# Patient Record
Sex: FEMALE | Race: WHITE | NOT HISPANIC OR LATINO | Employment: OTHER | URBAN - METROPOLITAN AREA
[De-identification: names, ages, dates, MRNs, and addresses within clinical notes are randomized per-mention and may not be internally consistent; named-entity substitution may affect disease eponyms.]

---

## 2024-04-26 ENCOUNTER — APPOINTMENT (OUTPATIENT)
Dept: RADIOLOGY | Facility: CLINIC | Age: 79
End: 2024-04-26
Payer: MEDICARE

## 2024-04-29 ENCOUNTER — HOSPITAL ENCOUNTER (OUTPATIENT)
Dept: RADIOLOGY | Facility: CLINIC | Age: 79
Discharge: HOME | End: 2024-04-29
Payer: MEDICARE

## 2024-04-29 DIAGNOSIS — Z78.0 ASYMPTOMATIC MENOPAUSAL STATE: ICD-10-CM

## 2024-04-29 PROCEDURE — 77080 DXA BONE DENSITY AXIAL: CPT | Performed by: RADIOLOGY

## 2024-04-29 PROCEDURE — 77080 DXA BONE DENSITY AXIAL: CPT

## 2024-05-17 ENCOUNTER — APPOINTMENT (OUTPATIENT)
Dept: CARDIOLOGY | Facility: HOSPITAL | Age: 79
End: 2024-05-17
Payer: MEDICARE

## 2024-05-17 ENCOUNTER — APPOINTMENT (OUTPATIENT)
Dept: RADIOLOGY | Facility: HOSPITAL | Age: 79
End: 2024-05-17
Payer: MEDICARE

## 2024-05-17 ENCOUNTER — HOSPITAL ENCOUNTER (OUTPATIENT)
Dept: RADIOLOGY | Facility: CLINIC | Age: 79
Discharge: HOME | End: 2024-05-17
Payer: MEDICARE

## 2024-05-17 ENCOUNTER — HOSPITAL ENCOUNTER (EMERGENCY)
Facility: HOSPITAL | Age: 79
Discharge: HOME | End: 2024-05-18
Attending: EMERGENCY MEDICINE
Payer: MEDICARE

## 2024-05-17 VITALS — BODY MASS INDEX: 31.65 KG/M2 | WEIGHT: 172 LBS | HEIGHT: 62 IN

## 2024-05-17 DIAGNOSIS — R07.9 CHEST PAIN, UNSPECIFIED TYPE: ICD-10-CM

## 2024-05-17 DIAGNOSIS — S42.201A CLOSED FRACTURE OF PROXIMAL END OF RIGHT HUMERUS, UNSPECIFIED FRACTURE MORPHOLOGY, INITIAL ENCOUNTER: ICD-10-CM
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
## 2024-05-17 DIAGNOSIS — Z12.31 ENCOUNTER FOR SCREENING MAMMOGRAM FOR MALIGNANT NEOPLASM OF BREAST: ICD-10-CM

## 2024-05-17 LAB
ALBUMIN SERPL BCP-MCNC: 4.4 G/DL (ref 3.4–5)
ALP SERPL-CCNC: 103 U/L (ref 33–136)
ALT SERPL W P-5'-P-CCNC: 13 U/L (ref 7–45)
ANION GAP SERPL CALC-SCNC: 17 MMOL/L (ref 10–20)
AST SERPL W P-5'-P-CCNC: 9 U/L (ref 9–39)
BASOPHILS # BLD AUTO: 0.07 X10*3/UL (ref 0–0.1)
BASOPHILS NFR BLD AUTO: 0.8 %
BILIRUB SERPL-MCNC: 0.5 MG/DL (ref 0–1.2)
BNP SERPL-MCNC: 53 PG/ML (ref 0–99)
BUN SERPL-MCNC: 20 MG/DL (ref 6–23)
CALCIUM SERPL-MCNC: 10.7 MG/DL (ref 8.6–10.3)
CARDIAC TROPONIN I PNL SERPL HS: <3 NG/L (ref 0–13)
CHLORIDE SERPL-SCNC: 105 MMOL/L (ref 98–107)
CO2 SERPL-SCNC: 23 MMOL/L (ref 21–32)
CREAT SERPL-MCNC: 0.92 MG/DL (ref 0.5–1.05)
EGFRCR SERPLBLD CKD-EPI 2021: 64 ML/MIN/1.73M*2
EOSINOPHIL # BLD AUTO: 0.07 X10*3/UL (ref 0–0.4)
EOSINOPHIL NFR BLD AUTO: 0.8 %
ERYTHROCYTE [DISTWIDTH] IN BLOOD BY AUTOMATED COUNT: 14.1 % (ref 11.5–14.5)
GLUCOSE SERPL-MCNC: 113 MG/DL (ref 74–99)
HCT VFR BLD AUTO: 39.2 % (ref 36–46)
HGB BLD-MCNC: 12.9 G/DL (ref 12–16)
IMM GRANULOCYTES # BLD AUTO: 0.03 X10*3/UL (ref 0–0.5)
IMM GRANULOCYTES NFR BLD AUTO: 0.4 % (ref 0–0.9)
LIPASE SERPL-CCNC: 42 U/L (ref 9–82)
LYMPHOCYTES # BLD AUTO: 2.88 X10*3/UL (ref 0.8–3)
LYMPHOCYTES NFR BLD AUTO: 34.7 %
MCH RBC QN AUTO: 28.7 PG (ref 26–34)
MCHC RBC AUTO-ENTMCNC: 32.9 G/DL (ref 32–36)
MCV RBC AUTO: 87 FL (ref 80–100)
MONOCYTES # BLD AUTO: 0.55 X10*3/UL (ref 0.05–0.8)
MONOCYTES NFR BLD AUTO: 6.6 %
NEUTROPHILS # BLD AUTO: 4.69 X10*3/UL (ref 1.6–5.5)
NEUTROPHILS NFR BLD AUTO: 56.7 %
NRBC BLD-RTO: 0 /100 WBCS (ref 0–0)
PLATELET # BLD AUTO: 370 X10*3/UL (ref 150–450)
POTASSIUM SERPL-SCNC: 3.4 MMOL/L (ref 3.5–5.3)
PROT SERPL-MCNC: 7.1 G/DL (ref 6.4–8.2)
RBC # BLD AUTO: 4.5 X10*6/UL (ref 4–5.2)
SODIUM SERPL-SCNC: 142 MMOL/L (ref 136–145)
WBC # BLD AUTO: 8.3 X10*3/UL (ref 4.4–11.3)

## 2024-05-17 PROCEDURE — 84484 ASSAY OF TROPONIN QUANT: CPT | Performed by: EMERGENCY MEDICINE

## 2024-05-17 PROCEDURE — 77063 BREAST TOMOSYNTHESIS BI: CPT | Performed by: RADIOLOGY

## 2024-05-17 PROCEDURE — 2500000004 HC RX 250 GENERAL PHARMACY W/ HCPCS (ALT 636 FOR OP/ED)

## 2024-05-17 PROCEDURE — 85025 COMPLETE CBC W/AUTO DIFF WBC: CPT | Performed by: EMERGENCY MEDICINE

## 2024-05-17 PROCEDURE — 96375 TX/PRO/DX INJ NEW DRUG ADDON: CPT

## 2024-05-17 PROCEDURE — 83880 ASSAY OF NATRIURETIC PEPTIDE: CPT | Performed by: EMERGENCY MEDICINE

## 2024-05-17 PROCEDURE — 93005 ELECTROCARDIOGRAM TRACING: CPT

## 2024-05-17 PROCEDURE — 71045 X-RAY EXAM CHEST 1 VIEW: CPT | Performed by: RADIOLOGY

## 2024-05-17 PROCEDURE — 96376 TX/PRO/DX INJ SAME DRUG ADON: CPT

## 2024-05-17 PROCEDURE — 77067 SCR MAMMO BI INCL CAD: CPT

## 2024-05-17 PROCEDURE — 73060 X-RAY EXAM OF HUMERUS: CPT | Mod: RT

## 2024-05-17 PROCEDURE — 36415 COLL VENOUS BLD VENIPUNCTURE: CPT | Performed by: EMERGENCY MEDICINE

## 2024-05-17 PROCEDURE — 99284 EMERGENCY DEPT VISIT MOD MDM: CPT | Mod: 25

## 2024-05-17 PROCEDURE — 80053 COMPREHEN METABOLIC PANEL: CPT | Performed by: EMERGENCY MEDICINE

## 2024-05-17 PROCEDURE — 77067 SCR MAMMO BI INCL CAD: CPT | Performed by: RADIOLOGY

## 2024-05-17 PROCEDURE — 73060 X-RAY EXAM OF HUMERUS: CPT | Mod: RIGHT SIDE | Performed by: RADIOLOGY

## 2024-05-17 PROCEDURE — 71045 X-RAY EXAM CHEST 1 VIEW: CPT

## 2024-05-17 PROCEDURE — 73030 X-RAY EXAM OF SHOULDER: CPT | Mod: RT

## 2024-05-17 PROCEDURE — 96374 THER/PROPH/DIAG INJ IV PUSH: CPT

## 2024-05-17 PROCEDURE — 2500000004 HC RX 250 GENERAL PHARMACY W/ HCPCS (ALT 636 FOR OP/ED): Performed by: EMERGENCY MEDICINE

## 2024-05-17 PROCEDURE — 83690 ASSAY OF LIPASE: CPT | Performed by: EMERGENCY MEDICINE

## 2024-05-17 PROCEDURE — 73030 X-RAY EXAM OF SHOULDER: CPT | Mod: RIGHT SIDE | Performed by: RADIOLOGY

## 2024-05-17 RX ORDER — KETOROLAC TROMETHAMINE 30 MG/ML
15 INJECTION, SOLUTION INTRAMUSCULAR; INTRAVENOUS ONCE
Status: DISCONTINUED | OUTPATIENT
Start: 2024-05-17 | End: 2024-05-17

## 2024-05-17 RX ORDER — OXYCODONE AND ACETAMINOPHEN 5; 325 MG/1; MG/1
1 TABLET ORAL ONCE
Status: DISCONTINUED | OUTPATIENT
Start: 2024-05-17 | End: 2024-05-17

## 2024-05-17 RX ORDER — SODIUM CHLORIDE 9 MG/ML
20 INJECTION, SOLUTION INTRAVENOUS CONTINUOUS
OUTPATIENT
Start: 2024-05-17

## 2024-05-17 RX ORDER — MORPHINE SULFATE 4 MG/ML
4 INJECTION INTRAVENOUS ONCE
Status: COMPLETED | OUTPATIENT
Start: 2024-05-17 | End: 2024-05-17

## 2024-05-17 RX ORDER — ONDANSETRON HYDROCHLORIDE 2 MG/ML
4 INJECTION, SOLUTION INTRAVENOUS ONCE
Status: COMPLETED | OUTPATIENT
Start: 2024-05-17 | End: 2024-05-17

## 2024-05-17 RX ORDER — MORPHINE SULFATE 4 MG/ML
INJECTION INTRAVENOUS
Status: COMPLETED
Start: 2024-05-17 | End: 2024-05-17

## 2024-05-17 RX ADMIN — MORPHINE SULFATE 4 MG: 4 INJECTION INTRAVENOUS at 21:47

## 2024-05-17 RX ADMIN — MORPHINE SULFATE 4 MG: 4 INJECTION INTRAVENOUS at 23:03

## 2024-05-17 RX ADMIN — ONDANSETRON 4 MG: 2 INJECTION INTRAMUSCULAR; INTRAVENOUS at 22:40

## 2024-05-17 ASSESSMENT — PAIN DESCRIPTION - LOCATION
LOCATION: SHOULDER
LOCATION: SHOULDER

## 2024-05-17 ASSESSMENT — PAIN SCALES - GENERAL
PAINLEVEL_OUTOF10: 10 - WORST POSSIBLE PAIN
PAINLEVEL_OUTOF10: 8
PAINLEVEL_OUTOF10: 10 - WORST POSSIBLE PAIN
PAINLEVEL_OUTOF10: 10 - WORST POSSIBLE PAIN
PAINLEVEL_OUTOF10: 8

## 2024-05-17 ASSESSMENT — LIFESTYLE VARIABLES
EVER FELT BAD OR GUILTY ABOUT YOUR DRINKING: NO
TOTAL SCORE: 0
HAVE PEOPLE ANNOYED YOU BY CRITICIZING YOUR DRINKING: NO
EVER HAD A DRINK FIRST THING IN THE MORNING TO STEADY YOUR NERVES TO GET RID OF A HANGOVER: NO
HAVE YOU EVER FELT YOU SHOULD CUT DOWN ON YOUR DRINKING: NO

## 2024-05-17 ASSESSMENT — COLUMBIA-SUICIDE SEVERITY RATING SCALE - C-SSRS
2. HAVE YOU ACTUALLY HAD ANY THOUGHTS OF KILLING YOURSELF?: NO
6. HAVE YOU EVER DONE ANYTHING, STARTED TO DO ANYTHING, OR PREPARED TO DO ANYTHING TO END YOUR LIFE?: NO
1. IN THE PAST MONTH, HAVE YOU WISHED YOU WERE DEAD OR WISHED YOU COULD GO TO SLEEP AND NOT WAKE UP?: NO

## 2024-05-17 ASSESSMENT — PAIN - FUNCTIONAL ASSESSMENT
PAIN_FUNCTIONAL_ASSESSMENT: 0-10

## 2024-05-17 ASSESSMENT — PAIN DESCRIPTION - ORIENTATION
ORIENTATION: RIGHT
ORIENTATION: RIGHT

## 2024-05-17 ASSESSMENT — PAIN DESCRIPTION - PROGRESSION: CLINICAL_PROGRESSION: GRADUALLY WORSENING

## 2024-05-17 ASSESSMENT — PAIN DESCRIPTION - PAIN TYPE: TYPE: ACUTE PAIN

## 2024-05-18 VITALS
WEIGHT: 172 LBS | TEMPERATURE: 97 F | SYSTOLIC BLOOD PRESSURE: 177 MMHG | RESPIRATION RATE: 17 BRPM | HEIGHT: 62 IN | OXYGEN SATURATION: 96 % | BODY MASS INDEX: 31.65 KG/M2 | HEART RATE: 92 BPM | DIASTOLIC BLOOD PRESSURE: 88 MMHG

## 2024-05-18 LAB — CARDIAC TROPONIN I PNL SERPL HS: 4 NG/L (ref 0–13)

## 2024-05-18 RX ORDER — OXYCODONE AND ACETAMINOPHEN 5; 325 MG/1; MG/1
1 TABLET ORAL EVERY 6 HOURS PRN
Qty: 12 TABLET | Refills: 0 | Status: SHIPPED | OUTPATIENT
Start: 2024-05-18 | End: 2024-05-21

## 2024-05-18 ASSESSMENT — HEART SCORE
TROPONIN: LESS THAN OR EQUAL TO NORMAL LIMIT
AGE: 65+
RISK FACTORS: 1-2 RISK FACTORS
HEART SCORE: 3
HISTORY: SLIGHTLY SUSPICIOUS
ECG: NORMAL

## 2024-05-18 ASSESSMENT — PAIN SCALES - GENERAL: PAINLEVEL_OUTOF10: 4

## 2024-05-18 NOTE — ED PROVIDER NOTES
Chief Complaint   Patient presents with    Fall     Pt reportedly fell and tripped over her dog this evening. Pt reports that she hit her right shoulder as she fell. Pt reports no loc and no blood thinners. Pt has swelling to the area on arrival. Pt is A&O x 4 at this time    Shoulder Injury       HPI       79 year old right hand dominant female presents to the Emergency Department today complaining of right shoulder pain status post fall that occurred just prior to arrival. Notes that she tripped over her dog and landed on her right side. Describes the pain as sharp in nature, constant, non-radiating, and varies in intensity. Believes that she might have hit her right forehead. Denies any loss of consciousness or seizure-like activity. Denies any other injuries.       History provided by:  Patient             Patient History   Past Medical History:   Diagnosis Date    Personal history of other endocrine, nutritional and metabolic disease     History of hypercholesterolemia    Traumatic subarachnoid hemorrhage with loss of consciousness status unknown, initial encounter (Multi)     Subarachnoid hemorrhage, traumatic     Past Surgical History:   Procedure Laterality Date    BREAST CYST ASPIRATION Left     OTHER SURGICAL HISTORY  09/12/2022    Cholecystoenterostomy    OTHER SURGICAL HISTORY  09/12/2022    Tubal ligation    OTHER SURGICAL HISTORY  09/12/2022    Hysterectomy    OTHER SURGICAL HISTORY  09/12/2022    Back surgery     Family History   Problem Relation Name Age of Onset    Ovarian cancer Sister       Social History     Tobacco Use    Smoking status: Not on file    Smokeless tobacco: Not on file   Substance Use Topics    Alcohol use: Not on file    Drug use: Not on file           Physical Exam  Constitutional:       Appearance: Normal appearance.   HENT:      Head: Normocephalic.      Right Ear: External ear normal.      Left Ear: External ear normal.      Ears:      Comments: Bilateral auditory canals are  non-inflamed and non-reddened. Bilateral TMs are pearly gray with good light reflex. No hemotympanum or steele signs noted.      Nose: Nose normal.      Comments: Septum midline without deviation. Turbinates are not inflamed and reddened. No septal hematoma noted.      Mouth/Throat:      Comments:  Mucous membranes are moist. All teeth are intact. Uvula midline without deviation rises upon phonation. Tonsils 1+ without exudate.   Eyes:      Comments: Bilateral conjunctiva are without injection, discharge, or drainage. PERRL with consensual pupil response bilaterally. EOM's are intact without any signs of entrapment. No hyphema or raccoon's eyes.   Cardiovascular:      Rate and Rhythm: Normal rate and regular rhythm.      Pulses:           Radial pulses are 3+ on the right side and 3+ on the left side.      Heart sounds: Normal heart sounds. No murmur heard.     No friction rub. No gallop.   Pulmonary:      Effort: Pulmonary effort is normal.      Breath sounds: Normal breath sounds. No wheezing, rhonchi or rales.   Abdominal:      General: Abdomen is flat. Bowel sounds are normal.      Palpations: Abdomen is soft.      Tenderness: There is no right CVA tenderness, left CVA tenderness, guarding or rebound. Negative signs include Arambula's sign and McBurney's sign.   Musculoskeletal:      Cervical back: Full passive range of motion without pain, normal range of motion and neck supple.      Comments: No edema, cyanosis, or clubbing noted. No obvious deformity or ecchymosis to the shoulder, but there is edema and tenderness present over the proximal humerus. Limited ROM. Right radial pulse is strong and regular. Capillary refill was within normal limits. Sensation is intact distally.     Lymphadenopathy:      Cervical: No cervical adenopathy.   Skin:     Comments: No rashes, lesions, petechiae, or purpura. No signs of infection.   Neurological:      Mental Status: She is alert and oriented to person, place, and time.       Comments: Alert and oriented to person, place, or time. Follows commands well. Hand grasps and push/pulls of lower extremities are equally strong bilaterally.    Psychiatric:         Attention and Perception: Attention normal.         Mood and Affect: Mood normal.         Speech: Speech normal.         Labs Reviewed   COMPREHENSIVE METABOLIC PANEL - Abnormal       Result Value    Glucose 113 (*)     Sodium 142      Potassium 3.4 (*)     Chloride 105      Bicarbonate 23      Anion Gap 17      Urea Nitrogen 20      Creatinine 0.92      eGFR 64      Calcium 10.7 (*)     Albumin 4.4      Alkaline Phosphatase 103      Total Protein 7.1      AST 9      Bilirubin, Total 0.5      ALT 13     B-TYPE NATRIURETIC PEPTIDE - Normal    BNP 53      Narrative:        <100 pg/mL - Heart failure unlikely  100-299 pg/mL - Intermediate probability of acute heart                  failure exacerbation. Correlate with clinical                  context and patient history.    >=300 pg/mL - Heart Failure likely. Correlate with clinical                  context and patient history.    BNP testing is performed using different testing methodology at [facility] than at other [facility]. Direct result comparisons should only be made within the same method.      LIPASE - Normal    Lipase 42      Narrative:     Venipuncture immediately after or during the administration of Metamizole may lead to falsely low results. Testing should be performed immediately prior to Metamizole dosing.   SERIAL TROPONIN-INITIAL - Normal    Troponin I, High Sensitivity <3      Narrative:     Less than 99th percentile of normal range cutoff-  Female and children under 18 years old <14 ng/L; Male <21 ng/L: Negative  Repeat testing should be performed if clinically indicated.     Female and children under 18 years old 14-50 ng/L; Male 21-50 ng/L:  Consistent with possible cardiac damage and possible increased clinical   risk. Serial measurements may  help to assess extent of myocardial damage.     >50 ng/L: Consistent with cardiac damage, increased clinical risk and  myocardial infarction. Serial measurements may help assess extent of   myocardial damage.      NOTE: Children less than 1 year old may have higher baseline troponin   levels and results should be interpreted in conjunction with the overall   clinical context.     NOTE: Troponin I testing is performed using a different   testing methodology at [facility] than at other   [facility]. Direct result comparisons should only   be made within the same method.   CBC WITH AUTO DIFFERENTIAL    WBC 8.3      nRBC 0.0      RBC 4.50      Hemoglobin 12.9      Hematocrit 39.2      MCV 87      MCH 28.7      MCHC 32.9      RDW 14.1      Platelets 370      Neutrophils % 56.7      Immature Granulocytes %, Automated 0.4      Lymphocytes % 34.7      Monocytes % 6.6      Eosinophils % 0.8      Basophils % 0.8      Neutrophils Absolute 4.69      Immature Granulocytes Absolute, Automated 0.03      Lymphocytes Absolute 2.88      Monocytes Absolute 0.55      Eosinophils Absolute 0.07      Basophils Absolute 0.07     TROPONIN SERIES- (INITIAL, 1 HR)    Narrative:     The following orders were created for panel order Troponin I Series, High Sensitivity (0, 1 HR).  Procedure                               Abnormality         Status                     ---------                               -----------         ------                     Troponin I, High Sensiti...[[id number]]  Normal              Final result               Troponin, High Sensitivi...[[id number]]                      In process                   Please view results for these tests on the individual orders.   SERIAL TROPONIN, 1 HOUR       XR chest 1 view   Final Result   No acute cardiopulmonary process.        MACRO:   None        Signed by: Clarisse Luevano 5/18/2024 12:08 AM   Dictation workstation:   [id number]      XR shoulder right 2+ views    Final Result   Comminuted impacted displaced fracturing of the proximal right   humerus.        MACRO:   None        Signed by: Enrique Mckenzie 5/17/2024 10:05 PM   Dictation workstation:   [id number]      XR humerus right   Final Result   Comminuted impacted displaced fracturing of the proximal right   humerus.        MACRO:   None        Signed by: Enrique Mckenzie 5/17/2024 10:05 PM   Dictation workstation:   [id number]               ED Course & MDM   ED Course as of 05/18/24 0110   Fri May 17, 2024   2246 EKG performed at 2244 and interpreted by me shows sinus rhythm at a rate of 88.  Intervals are normal.  The axis is normal.  There are no significant ST or T wave changes however there is some mild baseline artifact.  No STEMI. [SP]      ED Course User Index  [SP] Radha Barillas DO         Diagnoses as of 05/18/24 0110   Fall, initial encounter   Closed fracture of proximal end of right humerus, unspecified fracture morphology, initial encounter   Chest pain, unspecified type           Medical Decision Making  Patient was seen and evaluated by Dr. Barillas. Right shoulder and humerus x-rays show a displaced comminuted impacted proximal right humerus fracture. Given Fentanyl in route with slight improvement in her pain. Later, she was given Morphine x 2, Toradol, Percocet, and Zofran with improvement in her pain. Upon reassessment, I was discussing the findings of her x-rays and she began having midsternal chest pressure. At that time, labs were drawn with results as above. Heart Score- 3 with normal EKG and troponin with delta troponin.  At this time, we feel that the chest pain is atypical for acute coronary syndrome. We find no underlying evidence of an acute infectious process or pneumothorax on CXR. Clinically, we do not feel they are exhibiting signs of pulmonary embolism or thoracic aortic dissection (no connective tissue disorder, no tachycardia, tachypnea, hypoxia, and mediastinum normal in size on CXR).  Placed in a sling. Capillary refill was within normal limits and sensation was intact distally post-application. Given sling instructions. Given orthopedics for follow up. Discharged in stable condition with computer instructions.    Diagnostic Impression:    1. Acute closed comminuted impacted displaced proximal humerus fracture    2. Acute atypical chest pain    3. IV meds and fluids in ED     4. Prescription therapy            Your medication list      You have not been prescribed any medications.           Procedure  Procedures     Frank Zuñiga, ANDREEA-CHENCHO  05/18/24 0111       Radha Barillas DO  05/22/24 1318    This patient was seen by the ERICK.  I have personally seen and examined the patient. I made / approved the management plan and take responsibility for patient management. I reviewed and edited the above documentation where necessary.     Patient initially presented for arm pain in the setting of fall.  She was found to have a proximal humerus fracture.  At the time of discharge she was noted to have developed centrally located chest pain.  Because of this additional workup including labs and EKG was performed.  EKG does not show any ischemic changes.  Labs including 2 troponins are reassuring.  The patient was given additional pain medications primarily for her arm however this did resolve her chest pain as well.    At this time I do believe the patient to be stable for outpatient management.  Follow-up and return precautions were given.  Patient is encouraged to return to the emergency department should they develop any new or worsening symptoms.      Radha Barillas DO  Emergency Medicine         Radha Barillas DO  05/22/24 [id number]

## 2024-05-20 ENCOUNTER — APPOINTMENT (OUTPATIENT)
Dept: GASTROENTEROLOGY | Facility: HOSPITAL | Age: 79
End: 2024-05-20
Payer: MEDICARE

## 2024-05-25 LAB
ATRIAL RATE: 88 BPM
P AXIS: 51 DEGREES
PR INTERVAL: 148 MS
Q ONSET: 249 MS
QRS COUNT: 14 BEATS
QRS DURATION: 97 MS
QT INTERVAL: 373 MS
QTC CALCULATION(BAZETT): 452 MS
QTC FREDERICIA: 423 MS
R AXIS: 53 DEGREES
T AXIS: 24 DEGREES
T OFFSET: 436 MS
VENTRICULAR RATE: 88 BPM

## 2024-09-05 ENCOUNTER — ANESTHESIA EVENT (OUTPATIENT)
Dept: GASTROENTEROLOGY | Facility: HOSPITAL | Age: 79
End: 2024-09-05
Payer: MEDICARE

## 2024-09-09 ENCOUNTER — HOSPITAL ENCOUNTER (OUTPATIENT)
Dept: GASTROENTEROLOGY | Facility: HOSPITAL | Age: 79
Discharge: HOME | End: 2024-09-09
Payer: MEDICARE

## 2024-09-09 ENCOUNTER — ANESTHESIA (OUTPATIENT)
Dept: GASTROENTEROLOGY | Facility: HOSPITAL | Age: 79
End: 2024-09-09
Payer: MEDICARE

## 2024-09-09 VITALS
HEIGHT: 62 IN | WEIGHT: 157 LBS | HEART RATE: 94 BPM | TEMPERATURE: 97.5 F | RESPIRATION RATE: 14 BRPM | BODY MASS INDEX: 28.89 KG/M2 | DIASTOLIC BLOOD PRESSURE: 88 MMHG | OXYGEN SATURATION: 98 % | SYSTOLIC BLOOD PRESSURE: 135 MMHG

## 2024-09-09 DIAGNOSIS — R19.7 DIARRHEA, UNSPECIFIED TYPE: ICD-10-CM

## 2024-09-09 DIAGNOSIS — R10.84 GENERALIZED ABDOMINAL PAIN: ICD-10-CM

## 2024-09-09 DIAGNOSIS — R12 HEART BURN: ICD-10-CM

## 2024-09-09 PROBLEM — E78.5 HYPERLIPIDEMIA: Status: ACTIVE | Noted: 2024-09-09

## 2024-09-09 LAB — C DIF TOX TCDA+TCDB STL QL NAA+PROBE: NOT DETECTED

## 2024-09-09 PROCEDURE — 2500000004 HC RX 250 GENERAL PHARMACY W/ HCPCS (ALT 636 FOR OP/ED)

## 2024-09-09 PROCEDURE — 83630 LACTOFERRIN FECAL (QUAL): CPT | Performed by: SURGERY

## 2024-09-09 PROCEDURE — 43239 EGD BIOPSY SINGLE/MULTIPLE: CPT | Performed by: SURGERY

## 2024-09-09 PROCEDURE — 2500000004 HC RX 250 GENERAL PHARMACY W/ HCPCS (ALT 636 FOR OP/ED): Performed by: SURGERY

## 2024-09-09 PROCEDURE — 3700000001 HC GENERAL ANESTHESIA TIME - INITIAL BASE CHARGE

## 2024-09-09 PROCEDURE — 87329 GIARDIA AG IA: CPT | Performed by: SURGERY

## 2024-09-09 PROCEDURE — 87328 CRYPTOSPORIDIUM AG IA: CPT | Performed by: SURGERY

## 2024-09-09 PROCEDURE — 7100000009 HC PHASE TWO TIME - INITIAL BASE CHARGE

## 2024-09-09 PROCEDURE — 7100000010 HC PHASE TWO TIME - EACH INCREMENTAL 1 MINUTE

## 2024-09-09 PROCEDURE — 87493 C DIFF AMPLIFIED PROBE: CPT | Performed by: SURGERY

## 2024-09-09 PROCEDURE — 3700000002 HC GENERAL ANESTHESIA TIME - EACH INCREMENTAL 1 MINUTE

## 2024-09-09 PROCEDURE — 45380 COLONOSCOPY AND BIOPSY: CPT | Performed by: SURGERY

## 2024-09-09 PROCEDURE — 2500000005 HC RX 250 GENERAL PHARMACY W/O HCPCS

## 2024-09-09 RX ORDER — NITROGLYCERIN 0.4 MG/1
0.4 TABLET SUBLINGUAL EVERY 5 MIN PRN
COMMUNITY

## 2024-09-09 RX ORDER — COLESTIPOL HYDROCHLORIDE 5 G/5G
1 GRANULE, FOR SUSPENSION ORAL
COMMUNITY

## 2024-09-09 RX ORDER — LISINOPRIL 10 MG/1
10 TABLET ORAL DAILY
COMMUNITY

## 2024-09-09 RX ORDER — DEXTROMETHORPHAN HYDROBROMIDE, GUAIFENESIN 5; 100 MG/5ML; MG/5ML
650 LIQUID ORAL EVERY 8 HOURS PRN
COMMUNITY

## 2024-09-09 RX ORDER — METOPROLOL TARTRATE 25 MG/1
25 TABLET, FILM COATED ORAL 2 TIMES DAILY
COMMUNITY

## 2024-09-09 RX ORDER — DULOXETIN HYDROCHLORIDE 60 MG/1
60 CAPSULE, DELAYED RELEASE ORAL DAILY
COMMUNITY

## 2024-09-09 RX ORDER — MONTELUKAST SODIUM 10 MG/1
10 TABLET ORAL DAILY
COMMUNITY

## 2024-09-09 RX ORDER — PROPOFOL 10 MG/ML
INJECTION, EMULSION INTRAVENOUS AS NEEDED
Status: DISCONTINUED | OUTPATIENT
Start: 2024-09-09 | End: 2024-09-09

## 2024-09-09 RX ORDER — LIDOCAINE HYDROCHLORIDE 20 MG/ML
INJECTION, SOLUTION EPIDURAL; INFILTRATION; INTRACAUDAL; PERINEURAL AS NEEDED
Status: DISCONTINUED | OUTPATIENT
Start: 2024-09-09 | End: 2024-09-09

## 2024-09-09 RX ORDER — SODIUM CHLORIDE 9 MG/ML
20 INJECTION, SOLUTION INTRAVENOUS CONTINUOUS
Status: DISCONTINUED | OUTPATIENT
Start: 2024-09-09 | End: 2024-09-10 | Stop reason: HOSPADM

## 2024-09-09 RX ORDER — HYDROGEN PEROXIDE 3 %
20 SOLUTION, NON-ORAL MISCELLANEOUS
COMMUNITY

## 2024-09-09 RX ORDER — ASPIRIN 81 MG/1
81 TABLET ORAL DAILY
COMMUNITY

## 2024-09-09 RX ORDER — MECLIZINE HYDROCHLORIDE 25 MG/1
25 TABLET ORAL 3 TIMES DAILY PRN
COMMUNITY

## 2024-09-09 RX ORDER — GABAPENTIN 100 MG/1
100 CAPSULE ORAL NIGHTLY
COMMUNITY

## 2024-09-09 RX ORDER — ATORVASTATIN CALCIUM 80 MG/1
80 TABLET, FILM COATED ORAL DAILY
COMMUNITY

## 2024-09-09 SDOH — HEALTH STABILITY: MENTAL HEALTH: CURRENT SMOKER: 0

## 2024-09-09 ASSESSMENT — PAIN SCALES - GENERAL
PAINLEVEL_OUTOF10: 0 - NO PAIN
PAIN_LEVEL: 0
PAINLEVEL_OUTOF10: 0 - NO PAIN

## 2024-09-09 ASSESSMENT — PAIN - FUNCTIONAL ASSESSMENT
PAIN_FUNCTIONAL_ASSESSMENT: 0-10
PAIN_FUNCTIONAL_ASSESSMENT: 0-10

## 2024-09-09 ASSESSMENT — COLUMBIA-SUICIDE SEVERITY RATING SCALE - C-SSRS
1. IN THE PAST MONTH, HAVE YOU WISHED YOU WERE DEAD OR WISHED YOU COULD GO TO SLEEP AND NOT WAKE UP?: NO
2. HAVE YOU ACTUALLY HAD ANY THOUGHTS OF KILLING YOURSELF?: NO
6. HAVE YOU EVER DONE ANYTHING, STARTED TO DO ANYTHING, OR PREPARED TO DO ANYTHING TO END YOUR LIFE?: NO

## 2024-09-09 NOTE — DISCHARGE INSTRUCTIONS
Patient Instructions after a Colonoscopy      The anesthetics, sedatives or narcotics which were given to you today will be acting in your body for the next 24 hours, so you might feel a little sleepy or groggy.  This feeling should slowly wear off. Carefully read and follow the instructions.     You received sedation today:  - Do not drive or operate any machinery or power tools of any kind.   - No alcoholic beverages today, not even beer or wine.  - Do not make any important decisions or sign any legal documents.  - No over the counter medications that contain alcohol or that may cause drowsiness.  - Do not make any important decisions or sign any legal documents.  - Make sure you have someone with you for first 24 hours.    While it is common to experience mild to moderate abdominal distention, gas, or belching after your procedure, if any of these symptoms occur following discharge from the GI Lab or within one week of having your procedure, call the [facility] to be advised whether a visit to your nearest Urgent Care or Emergency Department is indicated.  Take this paper with you if you go.     - If you develop an allergic reaction to the medications that were given during your procedure such as difficulty breathing, rash, hives, severe nausea, vomiting or lightheadedness.  - If you experience chest pain, shortness of breath, severe abdominal pain, fevers and chills.  -If you develop signs and symptoms of bleeding such as blood in your spit, if your stools turn black, tarry, or bloody  - If you have not urinated within 8 hours following your procedure.  - If your IV site becomes painful, red, inflamed, or looks infected.    If you received a biopsy/polypectomy/sphincterotomy the following instructions apply below:    __ Do not use Aspirin containing products, non-steroidal medications or anti-coagulants for one week following your procedure. (Examples of these types of medications are: Advil,  Arthrotec, Aleve, Coumadin, Ecotrin, Heparin, Ibuprofen, Indocin, Motrin, Naprosyn, Nuprin, Plavix, Vioxx, and Voltarin, or their generic forms.  This list is not all-inclusive.  Check with your physician or pharmacist before resuming medications.)   __ Eat a soft diet today.  Avoid foods that are poorly digested for the next 24 hours.  These foods would include: nuts, beans, lettuce, red meats, and fried foods. Start with liquids and advance your diet as tolerated, gradually work up to eating solids.   __ Do not have a Barium Study or Enema for one week.    Your physician recommends the additional following instructions:    -You have a contact number available for emergencies. The signs and symptoms of potential delayed complications were discussed with you. You may return to normal activities tomorrow.  -Resume your previous diet.  -Continue your present medications.   -We are waiting for your pathology results.  -Your physician has recommended a repeat colonoscopy (date to be determined after pending pathology results are reviewed) for surveillance based on pathology results.  -The findings and recommendations have been discussed with you.  -The findings and recommendations were discussed with your family.  - Please see Medication Reconciliation Form for new medication/medications prescribed.       If you experience any problems or have any questions following discharge from the GI Lab, please call:        Nurse Signature                                                                        Date___________________                                                                            Patient/Responsible Party Signature                                        Date___________________

## 2024-09-09 NOTE — H&P
History Of Present Illness  Naina Jc is a 79 y.o. female presenting for EGD and colonoscopy.     Past Medical History  Past Medical History:   Diagnosis Date    Anxiety     Colon polyp     Depression     GERD (gastroesophageal reflux disease)     Hyperlipidemia     Hypertension     Irritable bowel     Personal history of other endocrine, nutritional and metabolic disease     History of hypercholesterolemia    Traumatic subarachnoid hemorrhage with loss of consciousness status unknown, initial encounter (Multi)     Subarachnoid hemorrhage, traumatic       Surgical History  Past Surgical History:   Procedure Laterality Date    BREAST CYST ASPIRATION Left     OTHER SURGICAL HISTORY  09/12/2022    Cholecystoenterostomy    OTHER SURGICAL HISTORY  09/12/2022    Tubal ligation    OTHER SURGICAL HISTORY  09/12/2022    Hysterectomy    OTHER SURGICAL HISTORY  09/12/2022    Back surgery        Social History  She reports that she has never smoked. She has never used smokeless tobacco. She reports that she does not currently use alcohol. She reports that she does not use drugs.    Family History  Family History   Problem Relation Name Age of Onset    Ovarian cancer Sister      Colon cancer Brother  60 - 69        Allergies  Patient has no known allergies.    Review of Systems     Physical Exam  Eyes:      Extraocular Movements: Extraocular movements intact.      Pupils: Pupils are equal, round, and reactive to light.   Cardiovascular:      Rate and Rhythm: Normal rate and regular rhythm.   Pulmonary:      Effort: Pulmonary effort is normal.   Abdominal:      Palpations: Abdomen is soft.   Skin:     General: Skin is warm and dry.   Neurological:      Mental Status: She is alert.   Psychiatric:         Mood and Affect: Mood normal.         Behavior: Behavior normal.          Last Recorded Vitals  Blood pressure 126/87, pulse (!) 116, temperature 36.3 °C (97.3 °F), temperature source Temporal, resp. rate 18, height 1.575 m  "(5' 2\"), weight 71.2 kg (157 lb), SpO2 98%.    Relevant Results             Assessment/Plan   Assessment & Plan  Generalized abdominal pain    Heart burn    Diarrhea, unspecified type             I spent 15 minutes in the professional and overall care of this patient.      Ana Yu MD    "

## 2024-09-09 NOTE — ANESTHESIA POSTPROCEDURE EVALUATION
Patient: Naina Jc    Procedure Summary       Date: 09/09/24 Room / Location: [facility]    Anesthesia Start: 1122 Anesthesia Stop: 1157    Procedures:       EGD      COLONOSCOPY Diagnosis:       Generalized abdominal pain      Heart burn      Diarrhea, unspecified type    Scheduled Providers: Ana Yu MD Responsible Provider: THERESE Rebolledo    Anesthesia Type: MAC ASA Status: 2            Anesthesia Type: MAC    Vitals Value Taken Time   /88 09/09/24 1218   Temp 36.4 °C (97.5 °F) 09/09/24 1218   Pulse 94 09/09/24 1207   Resp 14 09/09/24 1218   SpO2 98 % 09/09/24 1218       Anesthesia Post Evaluation    Patient location during evaluation: PACU  Patient participation: complete - patient participated  Level of consciousness: awake and alert  Pain score: 0  Pain management: adequate  Airway patency: patent  Cardiovascular status: acceptable  Respiratory status: acceptable  Hydration status: acceptable  Postoperative Nausea and Vomiting: none        No notable events documented.

## 2024-09-09 NOTE — ANESTHESIA PREPROCEDURE EVALUATION
Patient: Naina Jc    Procedure Information       Date/Time: 09/09/24 1115    Scheduled providers: Ana Yu MD    Procedures:       EGD      COLONOSCOPY    Location: [facility]            Relevant Problems   Anesthesia (within normal limits)      Cardiac   (+) Hyperlipidemia   (+) Hypertension      Neuro   (+) Anxiety   (+) Depression   (+) Traumatic subarachnoid hemorrhage with loss of consciousness status unknown, initial encounter (Multi)      GI   (+) GERD (gastroesophageal reflux disease)   (+) Irritable bowel      Endocrine   (+) Personal history of other endocrine, nutritional and metabolic disease      Digestive   (+) Colon polyp       Clinical information reviewed:    Allergies  Meds   Med Hx  Surg Hx   Fam Hx  Soc Hx        NPO Detail:  NPO/Void Status  Date of Last Liquid: 09/08/24  Time of Last Liquid: 2100  Date of Last Solid: 09/07/24  Time of Last Solid: 1400  Last Intake Type: Solid meal; Clear fluids  Time of Last Void: 0930         Physical Exam    Airway  Mallampati: II  TM distance: >3 FB  Neck ROM: full     Cardiovascular - normal exam     Dental - normal exam     Pulmonary - normal exam     Abdominal            Anesthesia Plan    History of general anesthesia?: yes  History of complications of general anesthesia?: no    ASA 2     MAC     The patient is not a current smoker.  Patient did not smoke on day of procedure.    intravenous induction   Anesthetic plan and risks discussed with patient.

## 2024-09-11 LAB — LACTOFERRIN STL QL IA: POSITIVE

## 2024-09-12 LAB
CRYPTOSP AG STL QL IA: NEGATIVE
G LAMBLIA AG STL QL IA: NEGATIVE

## 2024-09-15 LAB — O+P STL MICRO: NEGATIVE

## 2024-09-16 LAB
LABORATORY COMMENT REPORT: NORMAL
PATH REPORT.COMMENTS IMP SPEC: NORMAL
PATH REPORT.FINAL DX SPEC: NORMAL
PATH REPORT.GROSS SPEC: NORMAL
PATH REPORT.TOTAL CANCER: NORMAL

## 2025-01-07 ENCOUNTER — HOSPITAL ENCOUNTER (OUTPATIENT)
Dept: RADIOLOGY | Facility: HOSPITAL | Age: 80
Discharge: HOME | End: 2025-01-07
Payer: MEDICARE

## 2025-01-07 DIAGNOSIS — R51.9 HEADACHE, UNSPECIFIED: ICD-10-CM

## 2025-01-07 DIAGNOSIS — S09.90XS UNSPECIFIED INJURY OF HEAD, SEQUELA: ICD-10-CM

## 2025-01-07 DIAGNOSIS — R26.81 UNSTEADINESS ON FEET: ICD-10-CM

## 2025-01-07 PROCEDURE — 70450 CT HEAD/BRAIN W/O DYE: CPT | Performed by: RADIOLOGY

## 2025-01-07 PROCEDURE — 70450 CT HEAD/BRAIN W/O DYE: CPT

## 2025-01-09 ENCOUNTER — HOSPITAL ENCOUNTER (OUTPATIENT)
Dept: RADIOLOGY | Facility: HOSPITAL | Age: 80
Discharge: HOME | End: 2025-01-09
Payer: MEDICARE

## 2025-01-09 DIAGNOSIS — M25.511 PAIN IN RIGHT SHOULDER: ICD-10-CM

## 2025-01-09 PROCEDURE — 73030 X-RAY EXAM OF SHOULDER: CPT | Mod: RIGHT SIDE | Performed by: RADIOLOGY

## 2025-01-09 PROCEDURE — 73030 X-RAY EXAM OF SHOULDER: CPT | Mod: RT

## 2025-01-24 ENCOUNTER — EVALUATION (OUTPATIENT)
Dept: PHYSICAL THERAPY | Facility: HOSPITAL | Age: 80
End: 2025-01-24
Payer: MEDICARE

## 2025-01-24 DIAGNOSIS — R29.898 RIGHT LEG WEAKNESS: Primary | ICD-10-CM

## 2025-01-24 DIAGNOSIS — R26.81 UNSTEADINESS ON FEET: ICD-10-CM

## 2025-01-24 PROCEDURE — 97161 PT EVAL LOW COMPLEX 20 MIN: CPT | Mod: GP | Performed by: PHYSICAL THERAPIST

## 2025-01-24 PROCEDURE — 97110 THERAPEUTIC EXERCISES: CPT | Mod: GP | Performed by: PHYSICAL THERAPIST

## 2025-01-24 ASSESSMENT — BALANCE ASSESSMENTS
STANDING TO SITTING: SITS SAFELY WITH MINIMAL USE OF HANDS
TRANSFERS: ABLE TO TRANSFER SAFELY WITH MINOR USE OF HANDS
PLACE ALTERNATE FOOT ON STEP OR STOOL WHILE STANDING UNSUPPORTED: ABLE TO STAND INDEPENDENTLY AND SAFELY AND COMPLETE 8 STEPS IN 20 SECONDS
PLACE ALTERNATE FOOT ON STEP OR STOOL WHILE STANDING UNSUPPORTED: LOOKS BEHIND ONE SIDE ONLY OTHER SIDE SHOWS LESS WEIGHT SHIFT
STANDING ON ONE LEG: ABLE TO LIFT LEG INDEPENDENTLY AND HOLD GREATER THAN OR EQUAL TO 3 SECONDS
STANDING UNSUPPORTED WITH EYES CLOSED: ABLE TO STAND 10 SECONDS SAFELY
REACHING FORWARD WITH OUTSTRETCHED ARM WHILE STANDING: CAN REACH FORWARD CONFIDENTLY 25 CM (10 INCHES)
LONG VERSION TOTAL SCORE (MAX 56): 48
STANDING TO SITTING: ABLE TO STAND WITHOUT USING HANDS AND STABILIZE INDEPENDENTLY
SITTING WITH BACK UNSUPPORTED BUT FEET SUPPORTED ON FLOOR OR ON A STOOL: ABLE TO SIT SAFELY AND SECURELY FOR 2 MINUTES
PICK UP OBJECT FROM THE FLOOR FROM A STANDING POSITION: ABLE TO PICK UP SLIPPER SAFELY AND EASILY
TURN 360 DEGREES: ABLE TO TURN 360 DEGREES SAFELY BUT SLOWLY
STANDING UNSUPPORTED WITH FEET TOGETHER: ABLE TO PLACE FEET TOGETHER INDEPENDENTLY AND STAND 1 MINUTE SAFELY
STANDING UNSUPPORTED: ABLE TO STAND SAFELY FOR 2 MINUTES
STANDING UNSUPPORTED ONE FOOT IN FRONT: NEEDS HELP TO STEP BUT CAN HOLD 15 SECONDS

## 2025-01-24 ASSESSMENT — ENCOUNTER SYMPTOMS
DEPRESSION: 1
LOSS OF SENSATION IN FEET: 1
OCCASIONAL FEELINGS OF UNSTEADINESS: 1

## 2025-01-24 ASSESSMENT — PAIN SCALES - GENERAL: PAINLEVEL_OUTOF10: 4

## 2025-01-24 ASSESSMENT — PAIN - FUNCTIONAL ASSESSMENT: PAIN_FUNCTIONAL_ASSESSMENT: 0-10

## 2025-01-24 NOTE — LETTER
January 24, 2025    Stephen Levy DO  [address] [facility]  [place] [address]    Patient: Naina Jc   YOB: 1945   Date of Visit: 1/24/2025       Dear Stephen Levy DO  [address] [facility] [place] [address]    The attached plan of care is being sent to you because your patient’s medical reimbursement requires that you certify the plan of care. Your signature is required to allow uninterrupted insurance coverage.      You may indicate your approval by signing below and faxing this form back to us at Dept Fax: [phone].    Please call Dept: [phone] with any questions or concerns.    Thank you for this referral,        Casi Kapadia, PT  [address] [id number] [phone]    Payer: Payor: [employer] / Plan: [employer] MEDICARE / Product Type: *No Product type* /                                                                         Date:     Dear Cais Kapadia, PT,     Re: Ms. Naina Jc, MRN:[id number]    I certify that I have reviewed the attached plan of care and it is medically necessary for Ms. Naina Jc (1945) who is under my care.          ______________________________________                    _________________  Provider name and credentials                                           Date and time                                                                                           Plan of Care 1/24/25   Effective from: 1/24/2025  Effective to: 4/24/2025    Plan ID: [id number]            Participants as of Finalize on 1/24/2025    Name Type Comments Contact Info    Stephen Levy DO PCP - General  [phone]    Casi Kapadia PT Physical Therapist  [phone]       Last Plan Note     Author: Casi Kapadia PT Status: Incomplete Last edited: 1/24/2025  8:45 AM       Physical Therapy    Physical  Therapy Evaluation    Patient Name: Naina Jc  MRN: [id number]  Today's Date: 1/24/2025  Time Calculation  Start Time: 0845  Stop Time: 0930  Time Calculation (min): 45 min    PT Evaluation Time Entry  PT Evaluation (Low) Time Entry: 37  PT Therapeutic Procedures Time Entry  Therapeutic Exercise Time Entry: 8                   Visit # 1  Assessment  PT Assessment Results: Decreased range of motion, Decreased strength, Pain  Rehab Prognosis: Good  Evaluation/Treatment Tolerance: Patient tolerated treatment well  Pt. Is a 81 y/o female with dx of unsteadiness on feet. Pt. Is with 5 falls since may 2024. Pt. Is with mild balance impairment with HILL 48/56. Pt. Is with LE weakness R >L, and core weakness. Pt. Would benefit from skilled PT to address the above deficits    Plan  Treatment/Interventions: Cryotherapy, Hot pack, Education/ Instruction, Self care/ home management, Therapeutic exercises, Therapeutic activities  PT Plan: Skilled PT  Rehab Potential: Good  Plan of Care Agreement: Patient  2x/wk x 4 wk    Current Problem  1. Right leg weakness        2. Unsteadiness on feet  Referral to Physical Therapy          Subjective  Pt. Has fallen 5 times since May 2024; pt. Did fx her shld and was with revTSA. Pt. Most recent fall was in [place] Dec 2, 2024 and fx her nose, she was just leaning over while sitting to get something and just kept going forward. Pt. Also states [place] she tripped over her threshold going into her home.   She does use a walking stick when she is outside. She does not use any device in the house.   Pt. Did have a CT scan recently showing an infection in her posterior sinus and is to get this drained, she does feel like her head is foggy and feels tired always, she does feel dizzy with changing positions.     She does have 5 cats and a dog in her home.     She would like to work on her balance.     General:  General  Reason for Referral: intitial eval  Referred By: Dr. Velez,  DO  Preferred Learning Style: verbal  Precautions:  Precautions  Medical Precautions:  (vertigo in past but no symptoms recently;current posterior sinus infection to be drained soon; HA; HTN; neuropathy.)       Pain:  Pain Assessment: 0-10  0-10 (Numeric) Pain Score: 4  Pain Type: Chronic pain  Pain Location: Shoulder  Pain Orientation: Right  Home Living:  Wnl - lives alone in Winter usually in [place].  Family lives with her a few months of year in [place]. Basement with laundry but daughter does this.    Prior Function Per Pt/Caregiver Report:  Retired; Indep ADL's; driving; Indep housework.      Objective    Strength:  ALEJANDRO LE strength:   ALEJANDRO hips: 4-4+/5  ALEJANDRO knees: L 5/5;   R4/5  Ankle : DF wnl           Flexibility:    HS wnl;    ALEJANDRO calf ~10        Gait:. Pt. Amb indep ~100 feet x2 slow pace, slight decrease heel strike.      48/56 Liz     Other:  Visit 1: Eval and HEP     EXERCISES       Date       VISIT# # # # #    REPS REPS REPS REPS   nustep              Df str              // bars       Side amb       Back amb       Marching amb              3 way kicks              Balance on rkbd              Foam marching                                                                                                                       HEP                 Outcome Measures:   LIZ 48/56    OP EDUCATION:  Access Code: [email or web address]  URL: [email or web address]  Date: 01/24/2025  Prepared by: Casi Kapadia    Exercises  - Seated March  - 1 x daily - 7 x weekly - 3 sets - 10 reps  - Seated Hip Adduction Isometrics with Ball  - 1 x daily - 7 x weekly - 3 sets - 10 reps  - Seated Toe Raise  - 1 x daily - 7 x weekly - 3 sets - 10 reps  - Seated Long Arc Quad  - 1 x daily - 7 x weekly - 3 sets - 10 reps  Outpatient Education  Individual(s) Educated: Patient  Education Provided: Home Exercise Program, POC  Risk and Benefits Discussed with Patient/Caregiver/Other: yes  Patient/Caregiver Demonstrated  Understanding: yes  Plan of Care Discussed and Agreed Upon: yes  Patient Response to Education: Patient/Caregiver Verbalized Understanding of Information    Goals:  Active       LE weakness; unsteadiness on feet       Pt. Will be indep with progressive HEP to cont. Progress made in PT.        Start:  01/24/25    Expected End:  03/24/25            Pt. will have no falls in a months time.        Start:  01/24/25    Expected End:  04/24/25            Pt. will increase ALEJANDRO LE strength to wnl to allow increase functional mobility.        Start:  01/24/25    Expected End:  04/24/25            Pt. will increase HILL balance score by 5 points to significantly increase balance.        Start:  01/24/25    Expected End:  04/24/25                   Current Participants as of 1/24/2025    Name Type Comments Contact Info    Stephen Levy DO PCP - General  [phone]    Signature pending    Casi Kapadia, PT Physical Therapist  [phone]

## 2025-01-24 NOTE — PROGRESS NOTES
Physical Therapy    Physical Therapy Evaluation    Patient Name: Naina Jc  MRN: [id number]  Today's Date: 1/24/2025  Time Calculation  Start Time: 0845  Stop Time: 0930  Time Calculation (min): 45 min    PT Evaluation Time Entry  PT Evaluation (Low) Time Entry: 37  PT Therapeutic Procedures Time Entry  Therapeutic Exercise Time Entry: 8                   Visit # 1  Assessment  PT Assessment Results: Decreased range of motion, Decreased strength, Pain  Rehab Prognosis: Good  Evaluation/Treatment Tolerance: Patient tolerated treatment well  Pt. Is a 79 y/o female with dx of unsteadiness on feet. Pt. Is with 5 falls since may 2024. Pt. Is with mild balance impairment with HILL 48/56. Pt. Is with LE weakness R >L, and core weakness. Pt. Would benefit from skilled PT to address the above deficits    Plan  Treatment/Interventions: Cryotherapy, Hot pack, Education/ Instruction, Self care/ home management, Therapeutic exercises, Therapeutic activities  PT Plan: Skilled PT  Rehab Potential: Good  Plan of Care Agreement: Patient  2x/wk x 4 wk    Current Problem  1. Right leg weakness        2. Unsteadiness on feet  Referral to Physical Therapy          Subjective   Pt. Has fallen 5 times since May 2024; pt. Did fx her shld and was with revTSA. Pt. Most recent fall was in [place] Dec 2, 2024 and fx her nose, she was just leaning over while sitting to get something and just kept going forward. Pt. Also states [place] she tripped over her threshold going into her home.   She does use a walking stick when she is outside. She does not use any device in the house.   Pt. Did have a CT scan recently showing an infection in her posterior sinus and is to get this drained, she does feel like her head is foggy and feels tired always, she does feel dizzy with changing positions.     She does have 5 cats and a dog in her home.     She would like to work on her balance.     General:  General  Reason for Referral: intitial  eval  Referred By: Dr. Rosie DO  Preferred Learning Style: verbal  Precautions:  Precautions  Medical Precautions:  (vertigo in past but no symptoms recently;current posterior sinus infection to be drained soon; HA; HTN; neuropathy.)       Pain:  Pain Assessment: 0-10  0-10 (Numeric) Pain Score: 4  Pain Type: Chronic pain  Pain Location: Shoulder  Pain Orientation: Right  Home Living:  Wnl - lives alone in Winter usually in [place].  Family lives with her a few months of year in [place]. Basement with laundry but daughter does this.    Prior Function Per Pt/Caregiver Report:  Retired; Indep ADL's; driving; Indep housework.      Objective     Strength:  ALEJANDRO LE strength:   ALEJANDRO hips: 4-4+/5  ALEJANDRO knees: L 5/5;   R4/5  Ankle : DF wnl           Flexibility:    HS wnl;    ALEJANDRO calf ~10        Gait:. Pt. Amb indep ~100 feet x2 slow pace, slight decrease heel strike.      48/56 Liz     Other:  Visit 1: Eval and HEP     EXERCISES       Date       VISIT# # # # #    REPS REPS REPS REPS   nustep              Df str              // bars       Side amb       Back amb       Marching amb              3 way kicks              Balance on rkbd              Foam marching                                                                                                                       HEP                 Outcome Measures:   LIZ 48/56    OP EDUCATION:  Access Code: [email or web address]  URL: [email or web address]  Date: 01/24/2025  Prepared by: Casi Kapadia    Exercises  - Seated March  - 1 x daily - 7 x weekly - 3 sets - 10 reps  - Seated Hip Adduction Isometrics with Ball  - 1 x daily - 7 x weekly - 3 sets - 10 reps  - Seated Toe Raise  - 1 x daily - 7 x weekly - 3 sets - 10 reps  - Seated Long Arc Quad  - 1 x daily - 7 x weekly - 3 sets - 10 reps  Outpatient Education  Individual(s) Educated: Patient  Education Provided: Home Exercise Program, POC  Risk and Benefits Discussed with Patient/Caregiver/Other:  yes  Patient/Caregiver Demonstrated Understanding: yes  Plan of Care Discussed and Agreed Upon: yes  Patient Response to Education: Patient/Caregiver Verbalized Understanding of Information    Goals:  Active       LE weakness; unsteadiness on feet       Pt. Will be indep with progressive HEP to cont. Progress made in PT.        Start:  01/24/25    Expected End:  03/24/25            Pt. will have no falls in a months time.        Start:  01/24/25    Expected End:  04/24/25            Pt. will increase ALEJANDRO LE strength to wnl to allow increase functional mobility.        Start:  01/24/25    Expected End:  04/24/25            Pt. will increase HILL balance score by 5 points to significantly increase balance.        Start:  01/24/25    Expected End:  04/24/25

## 2025-01-29 ENCOUNTER — TREATMENT (OUTPATIENT)
Dept: PHYSICAL THERAPY | Facility: HOSPITAL | Age: 80
End: 2025-01-29
Payer: MEDICARE

## 2025-01-29 DIAGNOSIS — R29.898 RIGHT LEG WEAKNESS: Primary | ICD-10-CM

## 2025-01-29 DIAGNOSIS — R26.81 UNSTEADINESS ON FEET: ICD-10-CM

## 2025-01-29 PROCEDURE — 97110 THERAPEUTIC EXERCISES: CPT | Mod: GP,CQ | Performed by: PHYSICAL THERAPY ASSISTANT

## 2025-01-29 ASSESSMENT — PAIN SCALES - GENERAL: PAINLEVEL_OUTOF10: 5 - MODERATE PAIN

## 2025-01-29 ASSESSMENT — PAIN - FUNCTIONAL ASSESSMENT: PAIN_FUNCTIONAL_ASSESSMENT: 0-10

## 2025-01-29 NOTE — PROGRESS NOTES
"Physical Therapy    Physical Therapy Treatment    Patient Name: Naina Jc  MRN: [id number]  : 1945  Today's Date: 2025  Time Calculation  Start Time: 1500  Stop Time: 1547  Time Calculation (min): 47 min    PT Therapeutic Procedures Time Entry  Therapeutic Exercise Time Entry: 47          VISIT:# 2    Current Problem  1. Right leg weakness  Follow Up In Physical Therapy      2. Unsteadiness on feet  Follow Up In Physical Therapy          Subjective Naina reported she is feeling good other than her R shoulder. Pt reported she has a fear of falling. When she bends over she holds onto something,        Precautions  Precautions  Medical Precautions:  (vertigo in past but no symptoms recently;current posterior sinus infection to be drained soon; HA; HTN; neuropathy.)       Pain  Pain Assessment: 0-10  0-10 (Numeric) Pain Score: 5 - Moderate pain  Pain Type: Chronic pain  Pain Location: Shoulder  Pain Orientation: Right       Objective initiated exercises as per flow sheet.           Treatments:  EXERCISES       Date 25      VISIT# # # # #    REPS REPS REPS REPS   nustep L3 x 10'             Df str              // bars       Side amb       Back amb       Marching amb              3 way kicks              Balance on rkbd              Foam marching  SLS   Cone tap    3x30\" Sunny  Alt 2x10      Step over cones      Cone  6 cones x2 without UE support  6 cones (walking)      Leg Press  DLP  SLP  HR/TR   6B 2x10  5B 2x10  5B 2x10                                                                                                        HEP                 Outcome Measures:   LIZ 48/56    OP EDUCATION:  Access Code: [email or web address]  URL: [email or web address]  Date: 2025  Prepared by: Casi Kapadia    Exercises  - Seated March  - 1 x daily - 7 x weekly - 3 sets - 10 reps  - Seated Hip Adduction Isometrics with Ball  - 1 x daily - 7 x weekly - 3 sets - 10 reps  - Seated Toe Raise  - " 1 x daily - 7 x weekly - 3 sets - 10 reps  - Seated Long Arc Quad  - 1 x daily - 7 x weekly - 3 sets - 10 reps    Assessment:  PT Assessment  PT Assessment Results: Decreased range of motion, Decreased strength, Pain  Rehab Prognosis: Good  Assessment Comment: pt with good tolerance of program, She reported decreased shoulder pain to 0/10 at end of session. Decreased tightness. pt with education on safety and technique throughout session.       Plan:  OP PT Plan  Treatment/Interventions: Cryotherapy, Hot pack, Education/ Instruction, Self care/ home management, Therapeutic exercises, Therapeutic activities  PT Plan: Skilled PT  Rehab Potential: Good    Goals:  Active       LE weakness; unsteadiness on feet       Pt. Will be indep with progressive HEP to cont. Progress made in PT.        Start:  01/24/25    Expected End:  03/24/25            Pt. will have no falls in a months time.        Start:  01/24/25    Expected End:  04/24/25            Pt. will increase ALEJANDRO LE strength to wnl to allow increase functional mobility.        Start:  01/24/25    Expected End:  04/24/25            Pt. will increase HILL balance score by 5 points to significantly increase balance.        Start:  01/24/25    Expected End:  04/24/25

## 2025-02-05 ENCOUNTER — TREATMENT (OUTPATIENT)
Dept: PHYSICAL THERAPY | Facility: HOSPITAL | Age: 80
End: 2025-02-05
Payer: MEDICARE

## 2025-02-05 DIAGNOSIS — R29.898 RIGHT LEG WEAKNESS: Primary | ICD-10-CM

## 2025-02-05 DIAGNOSIS — R26.81 UNSTEADINESS ON FEET: ICD-10-CM

## 2025-02-05 PROCEDURE — 97110 THERAPEUTIC EXERCISES: CPT | Mod: GP,CQ | Performed by: PHYSICAL THERAPY ASSISTANT

## 2025-02-05 ASSESSMENT — PAIN SCALES - GENERAL: PAINLEVEL_OUTOF10: 0 - NO PAIN

## 2025-02-05 ASSESSMENT — PAIN - FUNCTIONAL ASSESSMENT: PAIN_FUNCTIONAL_ASSESSMENT: 0-10

## 2025-02-05 NOTE — PROGRESS NOTES
"Physical Therapy    Physical Therapy Treatment    Patient Name: Naina Jc  MRN: [id number]  : 1945  Today's Date: 2025  Time Calculation  Start Time: 1300  Stop Time: 1345  Time Calculation (min): 45 min    PT Therapeutic Procedures Time Entry  Therapeutic Exercise Time Entry: 45          VISIT:# 3    Current Problem  1. Right leg weakness  Follow Up In Physical Therapy      2. Unsteadiness on feet  Follow Up In Physical Therapy          Subjective Naina reporting she felt good after last session. She has a bruise on her forehead from running into cabinet. She fell  in her bedroom. She tripped over her dog stairs. Bruising on R knee .       Precautions  Precautions  Precautions Comment: falls Fell on 25       Pain  Pain Assessment: 0-10  0-10 (Numeric) Pain Score: 0 - No pain  Pain Type: Chronic pain  Pain Location: Shoulder  Pain Orientation: Right       Objective changes and additions to program as per flow sheet.            Treatments:  EXERCISES       Date 25     VISIT# # #3 # #    REPS REPS REPS REPS   nustep L3 x 10' L4 x10            Df str  2x30\"     HS stretch   2x30\"     // bars       Side amb  2 laps     Back amb  2 laps     Marching amb  2 laps            3 way kicks              Balance on rkbd              Foam marching  SLS   Cone tap   SLS cone    3x30\" Alejandro  Alt 2x10   3x30\" ALEJANDRO  Alt 8\" step 2x10  Cone on 8\"step 2x10 ALEJANDRO     Step over cones      Cone  6 cones x2 without UE support  6 cones (walking)      Leg Press  DLP  SLP  HR/TR   6B 2x10  5B 2x10  5B 2x10   6B 2x15  5B 2x15  5B 2x15                                                                                                         HEP           Assessment:  PT Assessment  PT Assessment Results: Decreased range of motion, Decreased strength, Pain  Rehab Prognosis: Good  Assessment Comment: Naina with no pain at end of session. She tolerated additions well. Education was provided for correct " technique.       Plan:  OP PT Plan  Treatment/Interventions: Cryotherapy, Hot pack, Education/ Instruction, Self care/ home management, Therapeutic exercises, Therapeutic activities  PT Plan: Skilled PT  Rehab Potential: Good    Goals:  Active       LE weakness; unsteadiness on feet       Pt. Will be indep with progressive HEP to cont. Progress made in PT.        Start:  01/24/25    Expected End:  03/24/25            Pt. will have no falls in a months time.        Start:  01/24/25    Expected End:  04/24/25            Pt. will increase ALEJANDRO LE strength to wnl to allow increase functional mobility.        Start:  01/24/25    Expected End:  04/24/25            Pt. will increase HILL balance score by 5 points to significantly increase balance.        Start:  01/24/25    Expected End:  04/24/25

## 2025-02-13 ENCOUNTER — DOCUMENTATION (OUTPATIENT)
Dept: PHYSICAL THERAPY | Facility: HOSPITAL | Age: 80
End: 2025-02-13
Payer: MEDICARE

## 2025-02-13 ENCOUNTER — APPOINTMENT (OUTPATIENT)
Dept: PHYSICAL THERAPY | Facility: HOSPITAL | Age: 80
End: 2025-02-13
Payer: MEDICARE

## 2025-02-13 DIAGNOSIS — R29.898 RIGHT LEG WEAKNESS: Primary | ICD-10-CM

## 2025-02-13 DIAGNOSIS — R26.81 UNSTEADINESS ON FEET: ICD-10-CM

## 2025-02-13 NOTE — PROGRESS NOTES
Physical Therapy                 Therapy Communication Note    Patient Name: Naina Jc  MRN: [id number]  Today's Date: 2025   : 1945      Discipline: Physical Therapy    Missed Visit Reason:     Missed Time: cancel         Comment: Pt. Ill; She will have sinus sx 25 with no lifting for 2 weeks.   Hold PT until released from sinus sx restrictions.  Pt. Advised to get a release for PT.

## 2025-02-20 ENCOUNTER — APPOINTMENT (OUTPATIENT)
Dept: PHYSICAL THERAPY | Facility: HOSPITAL | Age: 80
End: 2025-02-20
Payer: MEDICARE

## 2025-02-20 DIAGNOSIS — R29.898 RIGHT LEG WEAKNESS: Primary | ICD-10-CM

## 2025-02-20 DIAGNOSIS — R26.81 UNSTEADINESS ON FEET: ICD-10-CM

## 2025-02-25 PROCEDURE — 88305 TISSUE EXAM BY PATHOLOGIST: CPT | Performed by: STUDENT IN AN ORGANIZED HEALTH CARE EDUCATION/TRAINING PROGRAM

## 2025-02-25 PROCEDURE — 88305 TISSUE EXAM BY PATHOLOGIST: CPT

## 2025-02-26 ENCOUNTER — LAB REQUISITION (OUTPATIENT)
Dept: LAB | Facility: HOSPITAL | Age: 80
End: 2025-02-26
Payer: MEDICARE

## 2025-02-26 DIAGNOSIS — J32.2 CHRONIC ETHMOIDAL SINUSITIS: ICD-10-CM

## 2025-02-27 ENCOUNTER — APPOINTMENT (OUTPATIENT)
Dept: PHYSICAL THERAPY | Facility: HOSPITAL | Age: 80
End: 2025-02-27
Payer: MEDICARE

## 2025-02-27 DIAGNOSIS — R29.898 RIGHT LEG WEAKNESS: Primary | ICD-10-CM

## 2025-02-27 DIAGNOSIS — R26.81 UNSTEADINESS ON FEET: ICD-10-CM

## 2025-03-03 LAB
LABORATORY COMMENT REPORT: NORMAL
PATH REPORT.FINAL DX SPEC: NORMAL
PATH REPORT.GROSS SPEC: NORMAL
PATH REPORT.RELEVANT HX SPEC: NORMAL
PATH REPORT.TOTAL CANCER: NORMAL

## 2025-05-23 ENCOUNTER — TELEPHONE (OUTPATIENT)
Dept: CARDIOLOGY | Facility: HOSPITAL | Age: 80
End: 2025-05-23
Payer: MEDICARE

## 2025-05-23 NOTE — TELEPHONE ENCOUNTER
Called pt to go over current medications before appt. I LVM asking for pt to bring in a current med list to their next appt.     I also asked pt to have any recent cardiac testing and visits faxed to our office.    Geovany PEREZ

## 2025-05-27 ENCOUNTER — APPOINTMENT (OUTPATIENT)
Dept: CARDIOLOGY | Facility: CLINIC | Age: 80
End: 2025-05-27
Payer: MEDICARE

## 2025-05-27 VITALS
HEIGHT: 62 IN | DIASTOLIC BLOOD PRESSURE: 76 MMHG | OXYGEN SATURATION: 99 % | HEART RATE: 64 BPM | SYSTOLIC BLOOD PRESSURE: 122 MMHG | WEIGHT: 157 LBS | BODY MASS INDEX: 28.89 KG/M2

## 2025-05-27 DIAGNOSIS — Z01.810 PREOPERATIVE CARDIOVASCULAR EXAMINATION: Primary | ICD-10-CM

## 2025-05-27 DIAGNOSIS — I10 HYPERTENSION, UNSPECIFIED TYPE: ICD-10-CM

## 2025-05-27 DIAGNOSIS — I20.89 ATYPICAL ANGINA: ICD-10-CM

## 2025-05-27 DIAGNOSIS — I25.10 ASHD (ARTERIOSCLEROTIC HEART DISEASE): ICD-10-CM

## 2025-05-27 DIAGNOSIS — E78.5 HYPERLIPIDEMIA, UNSPECIFIED HYPERLIPIDEMIA TYPE: ICD-10-CM

## 2025-05-27 PROCEDURE — 3074F SYST BP LT 130 MM HG: CPT | Performed by: STUDENT IN AN ORGANIZED HEALTH CARE EDUCATION/TRAINING PROGRAM

## 2025-05-27 PROCEDURE — 93000 ELECTROCARDIOGRAM COMPLETE: CPT | Performed by: STUDENT IN AN ORGANIZED HEALTH CARE EDUCATION/TRAINING PROGRAM

## 2025-05-27 PROCEDURE — 3078F DIAST BP <80 MM HG: CPT | Performed by: STUDENT IN AN ORGANIZED HEALTH CARE EDUCATION/TRAINING PROGRAM

## 2025-05-27 PROCEDURE — 99215 OFFICE O/P EST HI 40 MIN: CPT | Performed by: STUDENT IN AN ORGANIZED HEALTH CARE EDUCATION/TRAINING PROGRAM

## 2025-05-27 PROCEDURE — 1157F ADVNC CARE PLAN IN RCRD: CPT | Performed by: STUDENT IN AN ORGANIZED HEALTH CARE EDUCATION/TRAINING PROGRAM

## 2025-05-27 PROCEDURE — 1036F TOBACCO NON-USER: CPT | Performed by: STUDENT IN AN ORGANIZED HEALTH CARE EDUCATION/TRAINING PROGRAM

## 2025-05-27 PROCEDURE — 1159F MED LIST DOCD IN RCRD: CPT | Performed by: STUDENT IN AN ORGANIZED HEALTH CARE EDUCATION/TRAINING PROGRAM

## 2025-05-27 RX ORDER — REGADENOSON 0.08 MG/ML
0.4 INJECTION, SOLUTION INTRAVENOUS
OUTPATIENT
Start: 2025-05-27

## 2025-05-27 RX ORDER — AMINOPHYLLINE 25 MG/ML
125 INJECTION, SOLUTION INTRAVENOUS ONCE AS NEEDED
OUTPATIENT
Start: 2025-05-27

## 2025-05-27 NOTE — PROGRESS NOTES
[facility] Note    Date: 05/27/25  Time: 1:20 PM    Subjective   Naina Jc is a 80 y.o. female who is coming to establish care.  Patient has a prior history of nonobstructive CAD.  Patient is planned for right knee arthroplasty and coming for preop risk stratification. Patient has h/o HTN, HLD, premature CAD in family, She had abnormal stress test and subsequently had LHC which was unremakable for significat CAD.  Patient reports the last 1 year she has been having more exertional dyspnea.  Denies any chest pain.  Denies any PND, orthopnea or edema.  Denies any syncope.  She has been on good medical therapy with aspirin, statin, beta-blocker.  Since her prior visit with cardiology she has not followed up with cardiology.    Patient denies any smoking or illicit drug use.      Review of Systems:  Otherwise, limited cardiovascular review of systems is negative.        Medical History:   She has a past medical history of Anxiety, Colon polyp, Depression, GERD (gastroesophageal reflux disease), Hyperlipidemia, Hypertension, Irritable bowel, Personal history of other endocrine, nutritional and metabolic disease, and Traumatic subarachnoid hemorrhage with loss of consciousness status unknown, initial encounter (Multi).  Surgical History:   Surgical History[1]PSHP@  Social History:   Social Drivers of Health with Concerns     Alcohol Use: Not on file   Financial Resource Strain: Not on file   Food Insecurity: Not on file   Transportation Needs: Not on file   Physical Activity: Not on file   Stress: Not on file   Social Connections: Not on file   Intimate Partner Violence: Not on file   Depression: Not on file   Housing Stability: Not on file   Utilities: Not on file   Digital Equity: Not on file   Health Literacy: Not on file     Family History:   Family History[2]   Allergies:  Oxycodone-acetaminophen    Outpatient Medications:  Current Outpatient Medications   Medication  "Instructions    acetaminophen (TYLENOL 8 HOUR) 650 mg, Every 8 hours PRN    aspirin 81 mg, Daily    atorvastatin (LIPITOR) 80 mg, Daily    colestipol (COLESTID) 1 g, 2 times daily after meals    DULoxetine (CYMBALTA) 60 mg, Daily    esomeprazole (NEXIUM) 20 mg, Daily before breakfast    gabapentin (NEURONTIN) 100 mg, Nightly    lisinopril 10 mg, Daily    meclizine (ANTIVERT) 25 mg, 3 times daily PRN    metoprolol tartrate (LOPRESSOR) 25 mg, 2 times daily    montelukast (SINGULAIR) 10 mg, Daily    nitroglycerin (NITROSTAT) 0.4 mg, Every 5 min PRN       Objective     Physical Exam  Vitals:    05/27/25 1123   BP: 122/76   BP Location: Left arm   Patient Position: Sitting   BP Cuff Size: Adult   Pulse: 64   SpO2: 99%   Weight: 71.2 kg (157 lb)   Height: 1.575 m (5' 2\")     Wt Readings from Last 3 Encounters:   05/27/25 71.2 kg (157 lb)   09/09/24 71.2 kg (157 lb)   05/17/24 78 kg (172 lb)       General: Alert and Oriented, No distress, cooperative  Head: Normocephalic without obvious abnormality, atraumatic  Eyes: Conjunctiva/corneas clear, EOM's grossly intact  Neck: Supple, trachea midline, No thyroid enlargement/tenderness/nodules; No JVD  Lungs: Clear to auscultation bilaterally, no wheezes, rhonci, or rales. respirations unlabored  Chest Wall: No tenderness or deformity  Heart: Regular rhythm, normal S1/S2, no murmur  Abdomen: Soft, non-tender, Non-distended, bowel sounds active  Extremities: No edema, no cyanosis, no clubbing  Skin: Skin color, texture, turgor normal.  No rashes or lesions noted  Neurologic: Alert and oriented x 3, grossly moving all extremities, speech intact        I have personally reviewed the following images and laboratory findings:  ECG: NSR, T wave abnormalities inferior leads consider ischemia     Echocardiogram:       C12/2022  Recommendations:  Maximize medical therapy.  Agressive risk factor modification efforts.  Telemetry monitoring.  Follow-up with cardiology clinic.  Monitor " vitals and arterial access site/pulses.  Lipid lowering agent or Statin therapy.  Medical management of coronary artery disease.  Aspirin therapy.     ____________________________________________________________________________________  CONCLUSIONS:   1. Mild nonobstructive CAD.    Baseline Echo: Normal left ventricular size and function. There are no regional wall motion abnormalities at baseline.     Stress Echo: Normal left ventricular size and function during peak stress. There are no stress induced regional wall motion abnormalities. The ejection fraction is approximately >75% at peak stress.     Summary:   1. Abnormal Dobutamine stress test secondary to chest pain and ekg changes with dobutamine administration.   2. Dr. Levy's (referring physician) office notified of abnormal stress test and recommendation for referral to cardiology.   3. No echocardiographic evidence for ischemia at maximal infusion      Laboratory values:   No visits with results within 2 Month(s) from this visit.   Latest known visit with results is:   Lab Requisition on 02/25/2025   Component Date Value    Case Report 02/25/2025                      Value:Surgical Pathology                                Case: [id number]                                  Authorizing Provider:  Wood Echeverria MD  Collected:           02/25/2025 1219              Ordering Location:     [facility]       Received:            02/26/2025 1211                                     Center                                                                       Pathologist:           Raphael Oneil DDS                                                            Specimen:    SINUS CONTENTS LEFT, SPHENOID SINUS CONTENTS L                                             FINAL DIAGNOSIS 02/25/2025                      Value:Sphenoid sinus, left contents:  - Mycetoma (fungus ball).        02/25/2025                      Value:By the signature on this report,  "the individual or group listed as making the Final Interpretation/Diagnosis certifies that they have reviewed this case.       Clinical History 02/25/2025                      Value:SPHENOID SINUSITIS    Gross Description 02/25/2025                      Value:Received in formalin, labeled with the patient's name and hospital number and \"sphenoid sinus contents L\", are multiple, irregular segments of cartilage, bone, and soft tissue aggregating to 1.8 x 0.7 x 0.5 cm. The specimen is entirely submitted in one cassette.   JEK/SHERRY          CBC -  Lab Results   Component Value Date    WBC 8.3 05/17/2024    HGB 12.9 05/17/2024    HCT 39.2 05/17/2024    MCV 87 05/17/2024     05/17/2024       CMP -  Lab Results   Component Value Date    CALCIUM 10.7 (H) 05/17/2024    PHOS 3.1 07/16/2022    PROT 7.1 05/17/2024    ALBUMIN 4.4 05/17/2024    AST 9 05/17/2024    ALT 13 05/17/2024    ALKPHOS 103 05/17/2024    BILITOT 0.5 05/17/2024       LIPID PANEL -   No results found for: \"CHOL\", \"HDL\", \"CHHDL\", \"LDL\", \"VLDL\", \"TRIG\", \"NHDL\"    RENAL FUNCTION PANEL -   Lab Results   Component Value Date    K 3.4 (L) 05/17/2024    PHOS 3.1 07/16/2022       Lab Results   Component Value Date    BNP 53 05/17/2024        Assessment/Plan   Atypical angina equivalent/Shortness of breath  Nonobstructive CAD  Essential hypertension  Hyperlipidemia  Preoperative risk stratification.    Plan:  -I will obtain transthoracic echo to evaluate any structural abnormalities.  -I will obtain nuclear stress test with Lexiscan to evaluate for ischemic changes.  -Continue aspirin, statin, metoprolol, lisinopril as taking.  -Check BMP and lipid panel.  -If his stress test and echocardiogram were unremarkable then patient can proceed with knee replacement/arthroscopy at low risk of perioperative mace.  All    In addition, the following orders were placed today:  Orders Placed This Encounter   Procedures    Basic metabolic panel    Lipid panel    ECG 12 Lead "    Transthoracic echo (TTE) complete                 SIGNATURE: Jerad Swanson MD PATIENT NAME: Naina Jc   DATE/TIME: May 27, 2025 1:20 PM MRN: [id number]                                  [1]   Past Surgical History:  Procedure Laterality Date    BREAST CYST ASPIRATION Left     OTHER SURGICAL HISTORY  09/12/2022    Cholecystoenterostomy    OTHER SURGICAL HISTORY  09/12/2022    Tubal ligation    OTHER SURGICAL HISTORY  09/12/2022    Hysterectomy    OTHER SURGICAL HISTORY  09/12/2022    Back surgery   [2]   Family History  Problem Relation Name Age of Onset    Ovarian cancer Sister      Colon cancer Brother  60 - 69

## 2025-05-29 ENCOUNTER — TELEPHONE (OUTPATIENT)
Dept: CARDIOLOGY | Facility: CLINIC | Age: 80
End: 2025-05-29
Payer: MEDICARE

## 2025-06-02 ENCOUNTER — HOSPITAL ENCOUNTER (OUTPATIENT)
Dept: CARDIOLOGY | Facility: CLINIC | Age: 80
Discharge: HOME | End: 2025-06-02
Payer: MEDICARE

## 2025-06-02 VITALS
BODY MASS INDEX: 28.89 KG/M2 | DIASTOLIC BLOOD PRESSURE: 76 MMHG | WEIGHT: 157 LBS | HEIGHT: 62 IN | HEART RATE: 73 BPM | SYSTOLIC BLOOD PRESSURE: 138 MMHG

## 2025-06-02 DIAGNOSIS — I25.10 ASHD (ARTERIOSCLEROTIC HEART DISEASE): ICD-10-CM

## 2025-06-02 DIAGNOSIS — I20.89 ATYPICAL ANGINA: ICD-10-CM

## 2025-06-02 DIAGNOSIS — R06.02 SHORTNESS OF BREATH: ICD-10-CM

## 2025-06-02 LAB
AORTIC VALVE PEAK VELOCITY: 1.48 M/S
AV PEAK GRADIENT: 9 MMHG
AVA (PEAK VEL): 2.71 CM2
EJECTION FRACTION APICAL 4 CHAMBER: 64.4
EJECTION FRACTION: 72 %
LEFT ATRIUM VOLUME AREA LENGTH INDEX BSA: 27.9 ML/M2
LEFT VENTRICLE INTERNAL DIMENSION DIASTOLE: 3.41 CM (ref 3.5–6)
LEFT VENTRICULAR OUTFLOW TRACT DIAMETER: 1.97 CM
MITRAL VALVE E/A RATIO: 0.72
RIGHT VENTRICLE FREE WALL PEAK S': 9 CM/S
RIGHT VENTRICLE PEAK SYSTOLIC PRESSURE: 29 MMHG
TRICUSPID ANNULAR PLANE SYSTOLIC EXCURSION: 1.8 CM

## 2025-06-02 PROCEDURE — 3430000001 HC RX 343 DIAGNOSTIC RADIOPHARMACEUTICALS: Performed by: INTERNAL MEDICINE

## 2025-06-02 PROCEDURE — A9502 TC99M TETROFOSMIN: HCPCS | Performed by: INTERNAL MEDICINE

## 2025-06-02 PROCEDURE — 93306 TTE W/DOPPLER COMPLETE: CPT

## 2025-06-02 PROCEDURE — 2500000004 HC RX 250 GENERAL PHARMACY W/ HCPCS (ALT 636 FOR OP/ED): Performed by: STUDENT IN AN ORGANIZED HEALTH CARE EDUCATION/TRAINING PROGRAM

## 2025-06-02 PROCEDURE — 93016 CV STRESS TEST SUPVJ ONLY: CPT | Performed by: INTERNAL MEDICINE

## 2025-06-02 PROCEDURE — 78452 HT MUSCLE IMAGE SPECT MULT: CPT | Performed by: INTERNAL MEDICINE

## 2025-06-02 PROCEDURE — 93018 CV STRESS TEST I&R ONLY: CPT | Performed by: INTERNAL MEDICINE

## 2025-06-02 PROCEDURE — 93017 CV STRESS TEST TRACING ONLY: CPT

## 2025-06-02 PROCEDURE — 78452 HT MUSCLE IMAGE SPECT MULT: CPT

## 2025-06-02 PROCEDURE — 93306 TTE W/DOPPLER COMPLETE: CPT | Performed by: INTERNAL MEDICINE

## 2025-06-02 RX ORDER — REGADENOSON 0.08 MG/ML
0.4 INJECTION, SOLUTION INTRAVENOUS
Status: COMPLETED | OUTPATIENT
Start: 2025-06-02 | End: 2025-06-02

## 2025-06-02 RX ADMIN — REGADENOSON 0.4 MG: 0.08 INJECTION, SOLUTION INTRAVENOUS at 08:58

## 2025-06-02 RX ADMIN — TETROFOSMIN 10 MILLICURIE: 0.23 INJECTION, POWDER, LYOPHILIZED, FOR SOLUTION INTRAVENOUS at 07:44

## 2025-06-02 RX ADMIN — TETROFOSMIN 30 MILLICURIE: 0.23 INJECTION, POWDER, LYOPHILIZED, FOR SOLUTION INTRAVENOUS at 08:58

## 2025-06-04 ENCOUNTER — TELEPHONE (OUTPATIENT)
Dept: CARDIOLOGY | Facility: HOSPITAL | Age: 80
End: 2025-06-04
Payer: MEDICARE

## 2025-06-04 NOTE — TELEPHONE ENCOUNTER
6/4/25  1120  Called echocardiogram, results to patient with patient verbalizing understanding.      ----- Message from Jerad Swanson sent at 6/2/2025 11:12 PM EDT -----  Mild, routine  ----- Message -----  From: Reed, Syngo - Cardiology Results In  Sent: 6/2/2025   7:37 AM EDT  To: Jerad Swanson MD

## 2025-06-04 NOTE — TELEPHONE ENCOUNTER
6/4/25  1121  Called stress test results and okay for surgery to patient with patient verbalizing understanding.    ----- Message from Jerad Swanson sent at 6/2/2025 11:06 PM EDT -----  All good, okay go for surgery as planned   ----- Message -----  From: Interface, Radiology Results In  Sent: 6/2/2025  10:31 AM EDT  To: Jerad Swanson MD

## 2025-08-22 ENCOUNTER — TELEPHONE (OUTPATIENT)
Dept: CARDIOLOGY | Facility: HOSPITAL | Age: 80
End: 2025-08-22
Payer: MEDICARE

## 2025-09-02 ENCOUNTER — APPOINTMENT (OUTPATIENT)
Dept: CARDIOLOGY | Facility: CLINIC | Age: 80
End: 2025-09-02
Payer: MEDICARE

## 2025-09-02 ASSESSMENT — ENCOUNTER SYMPTOMS: OCCASIONAL FEELINGS OF UNSTEADINESS: 0
